# Patient Record
Sex: FEMALE | Race: WHITE | NOT HISPANIC OR LATINO | Employment: UNEMPLOYED | URBAN - METROPOLITAN AREA
[De-identification: names, ages, dates, MRNs, and addresses within clinical notes are randomized per-mention and may not be internally consistent; named-entity substitution may affect disease eponyms.]

---

## 2024-01-10 ENCOUNTER — OFFICE VISIT (OUTPATIENT)
Dept: OBSTETRICS AND GYNECOLOGY | Facility: CLINIC | Age: 34
End: 2024-01-10
Payer: COMMERCIAL

## 2024-01-10 VITALS
WEIGHT: 145 LBS | DIASTOLIC BLOOD PRESSURE: 76 MMHG | SYSTOLIC BLOOD PRESSURE: 124 MMHG | HEIGHT: 55 IN | BODY MASS INDEX: 33.56 KG/M2

## 2024-01-10 DIAGNOSIS — T88.4XXS DIFFICULT AIRWAY FOR INTUBATION, SEQUELA: ICD-10-CM

## 2024-01-10 DIAGNOSIS — R19.00 PELVIC MASS: Primary | ICD-10-CM

## 2024-01-10 PROBLEM — T88.4XXA DIFFICULT AIRWAY FOR INTUBATION: Status: ACTIVE | Noted: 2024-01-10

## 2024-01-10 RX ORDER — LISDEXAMFETAMINE DIMESYLATE CAPSULES 50 MG/1
50 CAPSULE ORAL EVERY MORNING
COMMUNITY

## 2024-01-10 NOTE — PROGRESS NOTES
Chief Complaint  New Gyn (Patient is here for cyst on right fallopian ubes and left ovary )    Subjective        Eliana Campbell presents to [facility]  History of Present Illness    Patient here for concerns of pelvic mass.  She has actually already had an extensive workup for this at [facility] with Dr. Sheeba Denson.  Due to the patient's increased surgical risk, Dr. Denson had recommended the patient go to [facility] for management.  Patient states that she did not go there because they could not see her until February.    Patient has a history of having a very difficult airway.  Required tracheostomy for C/S in 2017 (unable to intubate).   Since that time has seen ENT at  of  - dilated trachea from size 3 to size 5 or 7.     Past Medical History:   Diagnosis Date    Anxiety     Asthma     Chronic lung disease     COPD mixed type     Deep vein thrombosis     Difficult airway for intubation     Difficult airway for intubation 1/10/2024    History of transfusion     Hydrocephalus     Trachea, stenosis      Past Surgical History:   Procedure Laterality Date    APPENDECTOMY       SECTION      CHOLECYSTECTOMY      SHUNT REVISION      TRACHEAL SURGERY      TRACHEOSTOMY      at birth    WISDOM TOOTH EXTRACTION       Family History   Problem Relation Age of Onset    COPD Father     Asthma Father     Hypertension Mother     Ovarian cancer Mother     Lung cancer Maternal Grandmother     Cancer Maternal Grandfather     Breast cancer Paternal Aunt     Uterine cancer Neg Hx     Colon cancer Neg Hx      Social History     Tobacco Use    Smoking status: Former   Substance Use Topics    Alcohol use: No    Drug use: No       Current Outpatient Medications:     Aromatic Inhalants (INHALER DECONGESTANT IN), Inhale., Disp: , Rfl:     lisdexamfetamine (Vyvanse) 50 MG capsule, Take 1 capsule by mouth Every Morning, Disp: , Rfl:     Prenatal Vit-Fe Fumarate-FA (PRENATAL, CLASSIC, VITAMIN)  "28-0.8 MG tablet tablet, Take  by mouth Daily. (Patient not taking: Reported on 1/10/2024), Disp: , Rfl:     Allergies   Allergen Reactions    Celecoxib     Dilaudid [Hydromorphone]     Morphine And Related     Zofran [Ondansetron] Hives     Review of systems:  Constitutional: No fevers, chills, sweats   Eye: No recent visual problems, denies blurry vision   HEENT: No ear pain, nasal congestion, sore throat, voice changes   Respiratory: No shortness of breath, cough, pain on breathing   Cardiovascular: No Chest pain, palpitations   Gastrointestinal: No nausea, vomiting, diarrhea, constipation   Genitourinary: No hematuria, dysuria, lesions on genitalia   Hema/Lymph: Negative for bruising, no edema   Endocrine: Negative for excessive thirst, excessive hunger, heat or cold intolerance   Musculoskeletal: No joint pain, muscle pain, decreased range of motion   Integumentary: No rash, pruritus, abrasions, lesions   Neurologic: No weakness, numbness, headaches   Psychiatric: No anxiety, depression, mood changes         Objective   Vital Signs:  /76   Ht 139.7 cm (55\")   Wt 65.8 kg (145 lb)   BMI 33.70 kg/m²   Estimated body mass index is 33.7 kg/m² as calculated from the following:    Height as of this encounter: 139.7 cm (55\").    Weight as of this encounter: 65.8 kg (145 lb).             Physical Exam   General: No acute distress, awake and oriented x3  HEENT: Normocephalic, atraumatic  Neck: Previous scar from prior tracheostomy  Psychiatric: good judgment and insight, normal mood  Neurological: cranial nerves II through XII intact, no deficits    Result Review :          CT abd/pelvis (12/12/2023):  FINDINGS:     LUNG BASES: Acutely clear.   OSSEOUS STRUCTURES: Intact and acutely unremarkable.     LIVER: Intrahepatic biliary ductal dilatation again noted and stable through the CBD at the ampulla. No obstructing process. Parenchyma of the liver is otherwise unremarkable.   GALLBLADDER: Absent.   SPLEEN: " Homogeneous.   PANCREAS: Homogeneous.   STOMACH: Homogeneous.   ADRENAL GLANDS: Homogeneous.   KIDNEYS: Severely atrophic right kidney. Left kidney acutely unremarkable.     MAJOR VESSELS: Acutely unremarkable.   LYMPH NODES: Several small nonenlarged scattered lymph nodes in the mesentery and retroperitoneum.   FREE FLUID/FREE AIR: None.   BOWEL: No evidence to suggest obstruction. Appendix is unremarkable.   PELVIC GENITOURINARY STRUCTURES: Large simple attenuating cystic mass arising from the left adnexa measuring 6.9 x 16.7 x 18.3 cm in largest AP, transverse and craniocaudal dimensions. Right-sided ovarian follicles or cysts are present. Right-sided cyst is simple measuring 3.0 x 2.7 cm. There appears to be hydrosalpinx which this cystic structure may be part of. There is a separate ovarian cystic structure measuring 24 x 19 mm. Uterus appears unremarkable. Urinary bladder is decompressed.   MISCELLANEOUS: No additional abnormality.     IMPRESSION:   1. Large cystic lesion in the central pelvis likely arising from the left adnexa. This could represent seroma or cystic ovarian lesion.     2. Right-sided hydrosalpinx with follicular changes of the ovary.     3. Severe right renal atrophy.     Pelvic US (11/2023):       Uterus:     Visualized    Size (cm)    L:  8.3       W:   4.8        H:  4.1   ----------------------------------------------------------------------   ENDOMETRIUM:       Endometrium:          Visualized    Thickness(mm):        8.85       ----------------------------------------------------------------------   CERVIX:       Trace of free fluid noted within the cervical canal.   ----------------------------------------------------------------------   RIGHT OVARY:        Status:   Visualized    Size (cm)    L:  2.1       W:   2.5        H:  2.9    Vol (ml):    8.0   ----------------------------------------------------------------------   LEFT OVARY:        Status:   Visualized    Size (cm)    L:   2.9       W:   3.1        H:  3.5    Vol (ml):    16.5   ----------------------------------------------------------------------   COMMENTS:       *Difficult exam due to bowel, was able to visualize    ovaries better transabdominally*        There were several hypoechoic cystic structures within    the left ovary the two largest measured: 1) 2.7 x 2.4 x    2.6cm, 2) 1.7 x 1.3 x 2.1cm.    A hypoechoic para-ovarian cystic structure seen    coming off of the right ovary measuring: 2.8 x 2.4 x    2.1cm.        A large hypoechoic area noted in right adnexa adjacent    to the right ovary measuring: 11.7 x 8.7cm. (free fluid    vs cyst).     Prior OBGYN Notes (Dr. Sheeba Denson):  Right lower quadrant pain    - US today interpreted and reviewed with patient - see above.   - Advised patient of need for surgery at [facility] - she has number and will contact Dr. Mackay's office directly to schedule.          Assessment and Plan   Diagnoses and all orders for this visit:    1. Pelvic mass (Primary)  -     Ovarian Malignancy Risk-JOHN  -     Ambulatory Referral to Gynecologic Oncology    2. Difficult airway for intubation, sequela    Patient has previously had evaluation for the above issues by Dr. Keller at [facility], who recommended the patient go to [facility] because of her many comorbidities and increased surgical risk.  Patient states that she did not go to [facility] because they told her they could not see her until February.  Ultimately, I agree with Dr. Denson's recommendation that she is too high risk to have surgery within our practice, and I would also recommend referral to [facility].    Patient does have large pelvic mass noted and likely does necessitate surgical exploration; however, patient is a VERY complicated surgical patient.  This is mostly related to problems with her airway.  She cannot be intubated in 2017 during her  and subsequently had to have tracheostomy performed.  She  subsequently has had tracheotomy revision.  Patient has seen [facility] related to this.  Given this, and the combination of her large pelvic mass, previous abdominal surgery, I would recommend that she see [facility] oncology.    Would also go ahead and get tumor markers at this time.  Patient agrees with this plan today.         Follow Up   No follow-ups on file.  Patient was given instructions and counseling regarding her condition or for health maintenance advice. Please see specific information pulled into the AVS if appropriate.

## 2024-01-13 LAB
CANCER AG125 SERPL-ACNC: 9.9 U/ML (ref 0–38.1)
HE4 SERPL-SCNC: 105 PMOL/L (ref 0–61.2)
LABORATORY COMMENT REPORT: ABNORMAL
POSTMENOPAUSAL INTERP: LOW: NORMAL
PREMENOPAUSAL INTERP: HIGH: NORMAL
ROMA SCORE POSTMEN SERPL: 1.72
ROMA SCORE PREMEN SERPL: 3.14